# Patient Record
Sex: MALE | Race: WHITE
[De-identification: names, ages, dates, MRNs, and addresses within clinical notes are randomized per-mention and may not be internally consistent; named-entity substitution may affect disease eponyms.]

---

## 2017-11-21 ENCOUNTER — HOSPITAL ENCOUNTER (OUTPATIENT)
Dept: HOSPITAL 92 - CT | Age: 22
Discharge: HOME | End: 2017-11-21
Attending: FAMILY MEDICINE
Payer: COMMERCIAL

## 2017-11-21 DIAGNOSIS — K59.00: ICD-10-CM

## 2017-11-21 DIAGNOSIS — I31.3: ICD-10-CM

## 2017-11-21 DIAGNOSIS — R10.84: ICD-10-CM

## 2017-11-21 DIAGNOSIS — I51.7: ICD-10-CM

## 2017-11-21 DIAGNOSIS — A04.8: Primary | ICD-10-CM

## 2017-11-21 DIAGNOSIS — B96.5: ICD-10-CM

## 2017-11-21 DIAGNOSIS — D30.02: ICD-10-CM

## 2017-11-21 DIAGNOSIS — D30.01: ICD-10-CM

## 2017-11-21 PROCEDURE — 74177 CT ABD & PELVIS W/CONTRAST: CPT

## 2017-11-21 NOTE — CT
CT ABDOMEN AND PELVIS WITH IV CONTRAST

11/21/17

 

HISTORY: 

Pseudomonas bacterial infection. Intestinal infection. Constipation. 

History of small bowel obstruction. Tumors found on kidneys. 

 

COMPARISON:  

None available. 

 

FINDINGS:  

The heart is mildly enlarged with suggestion of a tiny pericardial effusion. 

 

There is motion present at each lung base related to respiratory motion. There are multiple fat densi
ty lesions associated with each kidney, most consistent with renal angiomyolipomas. Largest fat densi
ty lesion is seen at the posterior aspect of the inferior pole right kidney  measuring 4.2 cm. There 
is a hypodense lesion seen within the mid portion of the left kidney which measures 1.2 cm and it is 
difficult to accurately characterize but there is artifact to this region due to dense contrast in th
e collecting system. 

 

The liver, spleen, pancreas, bilateral adrenal glands, and urinary bladder demonstrate a normal CT ap
pearance. The opacified small bowel is normal in caliber. 

 

There is a moderate amount of retained fecal material seen throughout the colon suggesting a degree o
f constipation which predominantly extends from the cecum to the splenic flexure. There is no free fl
uid, fluid collection, or lymphadenopathy seen in the abdomen or pelvis. 

 

There are mild degenerative changes seen in the spine with scattered Schmorl's nodes seen. 

 

IMPRESSION:  

1.      Multiple bilateral renal angiomyolipomas, largest in the inferior pole right kidney measuring
 4.2 cm. 

2.      Hypodensity lesion mid portion left kidney which is difficult to further characterize. 

3.      Mild cardiomegaly with tiny pericardial effusion.

4.      Constipation. 

5.      No fluid collection is seen to suggest an abscess, and there is no free fluid or lymphadenopa
thy seen in the abdomen or pelvis. 

 

POS: [facility]

## 2018-08-23 ENCOUNTER — HOSPITAL ENCOUNTER (OUTPATIENT)
Dept: HOSPITAL 92 - SDC | Age: 23
Discharge: HOME | End: 2018-08-23
Attending: OTOLARYNGOLOGY
Payer: COMMERCIAL

## 2018-08-23 VITALS — BODY MASS INDEX: 33.3 KG/M2

## 2018-08-23 DIAGNOSIS — J30.9: ICD-10-CM

## 2018-08-23 DIAGNOSIS — G40.909: ICD-10-CM

## 2018-08-23 DIAGNOSIS — Z79.899: ICD-10-CM

## 2018-08-23 DIAGNOSIS — J34.3: ICD-10-CM

## 2018-08-23 DIAGNOSIS — J34.2: ICD-10-CM

## 2018-08-23 DIAGNOSIS — J32.9: Primary | ICD-10-CM

## 2018-08-23 DIAGNOSIS — E78.00: ICD-10-CM

## 2018-08-23 NOTE — CT
CT SINUSES WITHOUT CONTRAST:

 

HISTORY:

Preoperative exam.  The patient is scheduled for sinus surgery.

 

COMPARISON:

None.

 

FINDINGS:

The patient has a tube in place for airway management.

 

The visualized aerodigestive tract is unremarkable.

 

Bilateral ocular lenses are appropriately located.  Both globes are intact.  The retrobulbar fat is p
reserved.  Symmetric attenuation of the optic nerves and ocular rectus muscles.

 

Coronal reformatted images demonstrate the right ostiomeatal complex is patent.  Linear density in th
e left maxillary ostium.  Minimal mucosal disease involving the paranasal sinuses.  No erosive or nimco
tructive changes.  There is adequate aeration of the frontal sinuses, ethmoid air cells, maxillary si
nuses, and sphenoid sinuses.  Minimal mucosal disease involving both maxillary sinuses.

 

Adequate mastoid air cell aeration.

 

The nasal septum is intact with minimal leftward deviation.

 

IMPRESSION:

1.  No destructive or hypertrophic changes with regard to the osseous margin of the sinuses.  Minimal
 mucosal thickening involving both maxillary sinuses.

 

2.  The right ostiomeatal complex is patent.  Subtle linear density in the left maxillary ostium.

 

POS: [facility]

## 2018-08-23 NOTE — OP
PREOPERATIVE DIAGNOSES:  Chronic sinusitis, deviated septum, hypertrophic inferior turbinates.

 

POSTOPERATIVE DIAGNOSES:  Chronic sinusitis, deviated septum, hypertrophic inferior turbinates.

 

PROCEDURE:

1.  Bilateral nasal endoscopy with maxillary antrostomy.

2.  Bilateral nasal endoscopy with total ethmoidectomy.

3.  Bilateral nasal endoscopy with frontal sinusotomy.

4.  Bilateral nasal endoscopy with sphenoidotomy.

5.  Bilateral nasal endoscopy with submucosal resection of inferior turbinates.

6.  Septoplasty.

 

PROCEDURE IN DETAIL:  After consent was obtained, the patient was identified, brought to the operatin
g room, and placed on the operating room table in the supine position.  Consent was obtained, notifyi
ng the patient of the possibility of additional infections, bleeding, brain injury, and eye/orbital i
njury.   The patient was placed on the operating room table, and general endotracheal anesthesia and 
intravenous access was obtained.  The patient was then positioned, prepped and draped for endoscopic 
sinus surgery.  Nasal preparation included trimming nasal vestibular hairs and spraying in topical Af
rin.  We then placed Afrin topical solution on nasal pledgets and strategically located them intranas
ally.  The perinasal mucosa was injected with 1% lidocaine with 1:100,000 epinephrine in the submucop
erichondrial plane of the septum, lateral nasal wall, and anterior to the uncinate.  The patient was 
then prepped and draped in a sterile fashion and positioned for endoscopic sinus surgery.

 

With the 0-degree endoscope, the patient underwent systematic nasal endoscopy.  There were no suspici
ous internasal masses or lesions identified.  We then focused our attention to the osteomeatal comple
x region under the middle turbinate.

 

Maxillary Antrostomy:  The uncinate was then identified and the extent of the uncinate was appreciate
d by out-fracturing the uncinate with the ball-tip probe.  We then used the sickle blade to disarticu
late the uncinate from the lateral nasal wall.  This was then removed with straight biting and upbiti
ng punches with the remaining shrouds of mucosa and bony septum removed with the micro-debrider.  The
 natural os of the maxillary sinus was then identified and enlarged with the maxillary punches and ba
ck biting forceps.

 

Total Ethmoidectomy:  The anterior face of the ethmoid bulla was entered and with the micro-debrider,
 dissection continued posteriorly to the ground lamella.  The limits of dissection included the inser
tion of the middle turbinate, medial orbital wall, and base of skull.  We similarly identified the fr
ontal recess and removed shrouds of bone and debris in that region to obtain patency into the agger n
asi region and frontal recess.  We then entered the ground lamella and its anteroinferior aspect and 
proceeded posteriorly, opening the posterior ethmoid air-cell system.  Again, the limits of dissectio
n included the base of skull and medial orbital wall.

 

Sphenoidotomy:  The anterior face of the sphenoid was identified and entered in its extreme anteroinf
erior aspect.  A sphenoid punch was then used to enlarge the sphenoidotomy and no injury to the optic
 nerve or internal carotid artery occurred.

 

Outfracture of the Inferior Turbinates:  The inferior turbinates were visualized under endoscopic vis
ualization and outfractured with the elevator.  The inferolateral edge of the inferior turbinate was 
then cauterized along its length with the suction cautery without difficulty.

 

Outfracture & Cautery of the Inferior Turbinates:  The inferior turbinates were visualized with a 0-d
egree endoscope and outfractured with a Roodhouse elevator.  The inferior medial aspect was cauterized wi
th the electrocautery.  Hemostasis was obtained.  After adequate airway was established, we turned ou
r attention to the contralateral side and used a similar procedure.  Again, a Catalino elevator was used
 to outfracture inferior turbinates under endoscopic visualization.  With a suction cautery, the free
 inferior medial aspect was cauterized under direct visualization along the length of the inferior tu
rbinate.

 

Septoplasty:  After local anesthesia was infiltrated into the submucoperichondrial plane, a standard 
Giovanny incision was made with a #15 blade down to the level of the septal cartilage.  The caudal heavenly
vator was used to elevate the mucoperichondrium from the underlying cartilage.  We then proceeded bey
ond the bony cartilaginous junction and elevated the bony periosteum as well.  Great attention was pa
id to the spur to prevent rent formation in the septal flap. A transcartilaginous incision was then m
chava, while preserving an adequate dorsal and caudal cartilaginous strut for tip support.  The deforme
d cartilage was removed and disarticulated from the bony cartilaginous junction and maxillary crest. 
 This was placed in saline and would later be crushed and returned to the mucoperichondrial envelope.
  We then elevated the contralateral periosteum from the bony cartilaginous region and removed the de
formed portions of the bone and bony spurs.  The cartilage was then crushed and placed back into the 
mucoperichondrial envelope and the mucosa was re-approximated with a quilting stitch composed of rapi
dly absorbent gut suture.  The Loxley incision was also closed with interrupted gut suture.  At the 
completion of the case, Zazueta splints were placed and suture secured to the caudal septum.

 

At this point, we then turned our attention to the contralateral side and proceeded with endoscopic s
inus surgery.

 

At the completion of the case, Rice keel splints were placed in the ethmoid cavities after the ethmoi
dectomy.  There were no complications.  The patient tolerated the procedure well and was discharged t
o the recovery room in stable condition prior to return to the preoperative Day Stay with [facility].  Prescriptions for pain medication and antibiotics were provided.  The patient received
 intramuscular Depo-Medrol during the case.